# Patient Record
Sex: MALE | Race: WHITE
[De-identification: names, ages, dates, MRNs, and addresses within clinical notes are randomized per-mention and may not be internally consistent; named-entity substitution may affect disease eponyms.]

---

## 2018-08-06 ENCOUNTER — HOSPITAL ENCOUNTER (OUTPATIENT)
Dept: HOSPITAL 95 - PLD | Age: 73
Discharge: HOME | End: 2018-08-06
Attending: DERMATOLOGY
Payer: MEDICARE

## 2018-08-06 DIAGNOSIS — C44.42: Primary | ICD-10-CM

## 2019-01-31 ENCOUNTER — HOSPITAL ENCOUNTER (OUTPATIENT)
Dept: HOSPITAL 95 - ORSCSDS | Age: 74
Discharge: HOME | End: 2019-01-31
Payer: MEDICARE

## 2019-01-31 VITALS — WEIGHT: 109.59 LBS | HEIGHT: 67.01 IN | BODY MASS INDEX: 17.2 KG/M2

## 2019-01-31 DIAGNOSIS — I10: ICD-10-CM

## 2019-01-31 DIAGNOSIS — Z86.73: ICD-10-CM

## 2019-01-31 DIAGNOSIS — H25.12: Primary | ICD-10-CM

## 2019-01-31 DIAGNOSIS — Z79.01: ICD-10-CM

## 2019-01-31 DIAGNOSIS — Z79.82: ICD-10-CM

## 2019-01-31 DIAGNOSIS — Z79.899: ICD-10-CM

## 2019-01-31 PROCEDURE — 08RK3JZ REPLACEMENT OF LEFT LENS WITH SYNTHETIC SUBSTITUTE, PERCUTANEOUS APPROACH: ICD-10-PCS

## 2019-01-31 PROCEDURE — V2632 POST CHMBR INTRAOCULAR LENS: HCPCS

## 2019-01-31 NOTE — NUR
01/31/19 1303 Duane Mccain
1ST IV ATTEMPT UNABLE TO CAPTURE VEIN, STARTED BY DUANE CONCEPCION
2ND IV ATTEMPT IN LFA SUCCESSFUL, STARTED BY DUANE CORONA

## 2019-03-14 ENCOUNTER — HOSPITAL ENCOUNTER (OUTPATIENT)
Dept: HOSPITAL 95 - ORSCSDS | Age: 74
Discharge: HOME | End: 2019-03-14
Payer: MEDICARE

## 2019-03-14 VITALS — WEIGHT: 111.8 LBS | HEIGHT: 67.01 IN | BODY MASS INDEX: 17.55 KG/M2

## 2019-03-14 DIAGNOSIS — Z79.899: ICD-10-CM

## 2019-03-14 DIAGNOSIS — F17.210: ICD-10-CM

## 2019-03-14 DIAGNOSIS — H25.11: Primary | ICD-10-CM

## 2019-03-14 DIAGNOSIS — I10: ICD-10-CM

## 2019-03-14 DIAGNOSIS — Z79.82: ICD-10-CM

## 2019-03-14 DIAGNOSIS — Z86.73: ICD-10-CM

## 2019-03-14 PROCEDURE — V2632 POST CHMBR INTRAOCULAR LENS: HCPCS

## 2019-03-14 PROCEDURE — 08RJ3JZ REPLACEMENT OF RIGHT LENS WITH SYNTHETIC SUBSTITUTE, PERCUTANEOUS APPROACH: ICD-10-PCS

## 2019-03-18 ENCOUNTER — HOSPITAL ENCOUNTER (OUTPATIENT)
Dept: HOSPITAL 95 - PLD | Age: 74
Discharge: HOME | End: 2019-03-18
Attending: DERMATOLOGY
Payer: MEDICARE

## 2019-03-18 DIAGNOSIS — C44.719: ICD-10-CM

## 2019-03-18 DIAGNOSIS — C44.319: Primary | ICD-10-CM

## 2019-04-04 ENCOUNTER — HOSPITAL ENCOUNTER (OUTPATIENT)
Dept: HOSPITAL 95 - LAB SHORT | Age: 74
Discharge: HOME | End: 2019-04-04
Attending: DERMATOLOGY
Payer: MEDICARE

## 2019-04-04 DIAGNOSIS — C44.310: Primary | ICD-10-CM
